# Patient Record
Sex: FEMALE | Race: WHITE | NOT HISPANIC OR LATINO | Employment: STUDENT | URBAN - METROPOLITAN AREA
[De-identification: names, ages, dates, MRNs, and addresses within clinical notes are randomized per-mention and may not be internally consistent; named-entity substitution may affect disease eponyms.]

---

## 2024-04-22 ENCOUNTER — OFFICE VISIT (OUTPATIENT)
Dept: URGENT CARE | Facility: CLINIC | Age: 24
End: 2024-04-22
Payer: COMMERCIAL

## 2024-04-22 VITALS
TEMPERATURE: 99 F | DIASTOLIC BLOOD PRESSURE: 80 MMHG | HEIGHT: 68 IN | WEIGHT: 165 LBS | OXYGEN SATURATION: 98 % | SYSTOLIC BLOOD PRESSURE: 122 MMHG | BODY MASS INDEX: 25.01 KG/M2 | RESPIRATION RATE: 15 BRPM | HEART RATE: 100 BPM

## 2024-04-22 DIAGNOSIS — R09.81 NASAL CONGESTION WITH RHINORRHEA: ICD-10-CM

## 2024-04-22 DIAGNOSIS — B96.89 ACUTE BACTERIAL SINUSITIS: Primary | ICD-10-CM

## 2024-04-22 DIAGNOSIS — J01.90 ACUTE BACTERIAL SINUSITIS: Primary | ICD-10-CM

## 2024-04-22 DIAGNOSIS — J34.89 NASAL CONGESTION WITH RHINORRHEA: ICD-10-CM

## 2024-04-22 DIAGNOSIS — J02.9 SORE THROAT: ICD-10-CM

## 2024-04-22 DIAGNOSIS — R05.8 OTHER COUGH: ICD-10-CM

## 2024-04-22 LAB
CTP QC/QA: YES
CTP QC/QA: YES
HETEROPH AB SER QL: NEGATIVE
MOLECULAR STREP A: NEGATIVE

## 2024-04-22 PROCEDURE — 87651 STREP A DNA AMP PROBE: CPT | Mod: QW,S$GLB,, | Performed by: PHYSICIAN ASSISTANT

## 2024-04-22 PROCEDURE — 86308 HETEROPHILE ANTIBODY SCREEN: CPT | Mod: QW,S$GLB,, | Performed by: PHYSICIAN ASSISTANT

## 2024-04-22 PROCEDURE — 99203 OFFICE O/P NEW LOW 30 MIN: CPT | Mod: S$GLB,,, | Performed by: PHYSICIAN ASSISTANT

## 2024-04-22 RX ORDER — AMOXICILLIN AND CLAVULANATE POTASSIUM 875; 125 MG/1; MG/1
1 TABLET, FILM COATED ORAL 2 TIMES DAILY
Qty: 20 TABLET | Refills: 0 | Status: SHIPPED | OUTPATIENT
Start: 2024-04-22 | End: 2024-05-02

## 2024-04-22 RX ORDER — BROMPHENIRAMINE MALEATE, PSEUDOEPHEDRINE HYDROCHLORIDE, AND DEXTROMETHORPHAN HYDROBROMIDE 2; 30; 10 MG/5ML; MG/5ML; MG/5ML
10 SYRUP ORAL EVERY 4 HOURS PRN
Qty: 118 ML | Refills: 0 | Status: SHIPPED | OUTPATIENT
Start: 2024-04-22 | End: 2024-04-29

## 2024-04-22 RX ORDER — FLUTICASONE PROPIONATE 50 MCG
2 SPRAY, SUSPENSION (ML) NASAL DAILY PRN
Qty: 15.8 ML | Refills: 0 | Status: SHIPPED | OUTPATIENT
Start: 2024-04-22 | End: 2024-05-22

## 2024-04-22 NOTE — PATIENT INSTRUCTIONS
Recommend oral antihistamine (loratadine [Claritin]/cetrizine [Zyrtec]) +/- oral decongestant (pseudoephedrine) for rhinorrhea, steroid nasal spray (flonase), prescription/OTC cough medicine [brompheniramine-pseudoeph-DM (BROMFED DM) ] as needed during day before 8 pm,  Nyquil at night, Tylenol (Acetaminophen) and/or Motrin (Ibuprofen) as directed for control of pain and/or fever.   Bromphed contains pseudoephedrine so please do not take a separate oral decongestant (sudafed/pseudoephedrine) or stimulant (adderall/vyvanse) for ADHD.       If you were prescribed antibiotics, please take them to completion.  Please drink plenty of fluids.  Please get plenty of rest.  Nasal irrigation with a saline spray or Netti Pot like device per their directions is also recommended.  If you  smoke, please stop smoking.    To help ease a sore throat, you can:  Use a sore throat spray.  Suck on hard candy or throat lozenges.  Gargle with warm saltwater a few times each day. Mix of 1/4 teaspoon (1.25 grams) salt in 8 ounces (240 mL) of warm water.  Use a cool mist humidifier to help you breathe easier.    If you negative (-) for a COVID test today and you are continuing to have symptoms, it is recommended to repeat the test in 48 hours x 3. If you continue to be negative, you may return to school/work once you have improved symptoms and no fever for 24 hours without any medications. This applies to all viral illnesses.         Discussed prescriptions and over-the-counter medicines to help with patient's symptoms:  A steroid nose spray (flonase) and antihistamine nasal spray (azelastine) can help with a stuffy nose. It can also help with drainage down the back of your throat.  An antihistamine (loratadine,zyrtec,allegra, xyzal) can help with itching, sneezing, or runny nose.  An antihistamine eye drop can help with itchy eyes.  A decongestant (pseudoephedrine,  Phenylephrine) can help with a stuffy nose. Take <10 days for congestion  and rhinorrhea. Once symptoms improve, proceed with loratadine/zyrtec once a day. These ingredients can keep you up all night, decrease appetite, feel jittery, and raise blood pressure with long term use.  OTC Coricidin can be used for patients with hypertension and palpitations because you cannot use ingredients such as pseudoephedrine and phenylephrine for oral decongestants    Medications that control cough are suppressants and expectorants. Suppressants are tessalon pearls and dextromethorphan. If you have a productive cough with sputum, you need an expectorant called guaifenesin. Dextromethorphan and Guaifenesin are active ingredients in many OTC cough/cold medications such as Dayquil/Nyquil, Mucinex, and Robitussin Mucus+Chest Congestion.            Common Cold Medicine Ingredients Cheat sheet  Acetaminophen (APAP) -pain reliever/fever reducer  Dextromethorphan - cough suppressant  Guaifenesin - expectorant/thins and loosens mucus  Phenylephrine - nasal decongestant  Diphenhydramine or Doxylamine succinate - antihistamine, helps you fall asleep  Promethazine or Brompheniramine - Prescription strength antihistamines    These OTC cold medications are safe to use if you do not have high blood pressure (hypertension) or palpitations.  DayQuil and NyQuil - Cough, Cold & Flu Relief LiquiCaps  DayQuil: Acetaminophen, Dextromethorphan, and Phenylephrine   NyQuil: Acetaminophen, Dextromethorphan, and Doxylamine  DayQuil and NyQuil SEVERE Maximum Strength Cough, Cold & Flu Relief LiquiCaps  DAYQUIL: Acetaminophen, Dextromethorphan, Guaifenesin, and Phenylephrine  NYQUIL: Acetaminophen, Dextromethorphan, Doxylamine, and Phenylephrine   Mucinex DM: Guaifenesin,Dextromethorphan  Mucinex Maximum Strength Sinus-Max® Pressure, Pain & Cough Liquid Gels: Acetaminophen/Dextromethorphan/ Guaifenesin/Phenylephrine     If not allergic, take Tylenol (Acetaminophen) 650 mg to  1 g every 6 hours as needed for fever and/or Motrin  (Ibuprofen) 600 to 800 mg every 6 hours as needed for fever as directed for control of pain and/or fever          Please remember that you have received care at an urgent care today. Urgent cares are not emergency rooms and are not equipped to handle life threatening emergencies and cannot rule in or out certain medical conditions and you may be released before all of your medical problems are known or treated.     Please arrange follow up with your primary care physician or speciality clinic within 2-5 days if your signs and symptoms have not resolved or worsen.     Patient can call our Referral Hotline at [phone] to make an appointment.      Please return here or go to the Emergency Department for any concerns or worsening of condition.  Signs of infection. These include a fever of 100.4°F (38°C) or higher, chills, cough, more sputum or change in color of sputum.  You are having so much trouble breathing that you can only say one or two words at a time.  You need to sit upright at all times to be able to breathe and or cannot lie down.  You have trouble breathing when talking or sitting still.  You have a fever of 100.4°F (38°C) or higher or chills.  You have chest pain when you cough, have trouble breathing but can still talk in full sentences, or cough up blood.

## 2024-04-22 NOTE — PROGRESS NOTES
"Subjective:      Patient ID: Kita Vo is a 23 y.o. female.    Vitals:  height is 5' 8" (1.727 m) and weight is 74.8 kg (165 lb). Her tympanic temperature is 98.6 °F (37 °C). Her blood pressure is 122/80 and her pulse is 100. Her respiration is 15 and oxygen saturation is 98%.     Chief Complaint: Sore Throat (Entered by patient)    Kita Vo is a 23 y.o. female who complains of sore throat for 3 days. Patient notes pain when swallowing that is not worse on one side. Patient with recent exposure to strep. No fever. She reports 2 week history of cough, congestion, and ear fullness. She has tried robitussin-DM with moderate improvement.     Sore Throat   This is a new problem. The current episode started in the past 7 days (3 days). The problem has been unchanged. Neither side of throat is experiencing more pain than the other. There has been no fever. Associated symptoms include congestion, coughing, ear pain and a plugged ear sensation. Pertinent negatives include no abdominal pain, diarrhea, drooling, ear discharge, headaches, hoarse voice, neck pain, shortness of breath, stridor, swollen glands, trouble swallowing or vomiting. She has had exposure to strep. She has had no exposure to mono. She has tried nothing for the symptoms. The treatment provided no relief.       Constitution: Negative for activity change, appetite change, chills, fatigue, fever and generalized weakness.   HENT:  Positive for ear pain, congestion, postnasal drip, sinus pain, sinus pressure and sore throat. Negative for ear discharge, foreign body in ear, tinnitus, drooling, trouble swallowing and voice change.    Neck: Negative for neck pain, neck stiffness and painful lymph nodes.   Cardiovascular:  Negative for chest pain, leg swelling, palpitations and sob on exertion.   Eyes:  Negative for eye itching, eye redness, double vision, blurred vision and eyelid swelling.   Respiratory:  Positive for cough. Negative for sputum " production, shortness of breath, stridor, wheezing and asthma.    Gastrointestinal:  Negative for abdominal pain, nausea, vomiting and diarrhea.   Genitourinary:  Negative for dysuria, frequency and urgency.   Musculoskeletal:  Negative for muscle cramps and muscle ache.   Skin:  Negative for rash.   Allergic/Immunologic: Negative for environmental allergies, seasonal allergies, food allergies and asthma.   Neurological:  Negative for headaches.   Hematologic/Lymphatic: Negative for swollen lymph nodes.   Psychiatric/Behavioral:  Negative for nervous/anxious. The patient is not nervous/anxious.       Objective:     Physical Exam   Constitutional: She is oriented to person, place, and time. No distress.      Comments:Patient is awake and alert, sitting up in exam chair, speaking and answering in complete sentences   normal  HENT:   Head: Normocephalic and atraumatic.      Comments: pain elicited to areas when patient lean forward.  Patient observed sniffling and frequently clearing her throat.    Ears:   Right Ear: External ear and ear canal normal. A middle ear effusion is present.   Left Ear: External ear and ear canal normal. A middle ear effusion is present.   Nose: Rhinorrhea, sinus tenderness and congestion present. Right sinus exhibits maxillary sinus tenderness. Right sinus exhibits no frontal sinus tenderness. Left sinus exhibits maxillary sinus tenderness. Left sinus exhibits no frontal sinus tenderness.   Mouth/Throat: Uvula is midline, oropharynx is clear and moist and mucous membranes are normal. Mucous membranes are moist. No oropharyngeal exudate or posterior oropharyngeal erythema. Tonsils are 0 on the right. Tonsils are 0 on the left. Oropharynx is clear.      Comments:  postnasal discharge noted on the posterior pharyngeal wall    Eyes: Conjunctivae are normal. Pupils are equal, round, and reactive to light. Extraocular movement intact   Neck: Neck supple.   Cardiovascular: Normal rate, regular  rhythm, normal heart sounds and normal pulses.   Pulmonary/Chest: Effort normal and breath sounds normal. No respiratory distress. She has no wheezes. She has no rhonchi. She has no rales.   Abdominal: Normal appearance.   Musculoskeletal: Normal range of motion.         General: Normal range of motion.      Cervical back: She exhibits tenderness.   Lymphadenopathy:     She has cervical adenopathy.   Neurological: She is alert and oriented to person, place, and time.   Skin: Skin is warm. Capillary refill takes less than 2 seconds.   Psychiatric: Her behavior is normal. Mood, judgment and thought content normal.   Nursing note and vitals reviewed.      Assessment:     1. Acute bacterial sinusitis    2. Sore throat    3. Nasal congestion with rhinorrhea    4. Other cough      Patient presents with clinical exam findings and history consistent with above.      On exam, patient is nontoxic appearing and vitals are stable.      Diagnostic testing results were reviewed and discussed with patient/guardian.   Tests ordered in clinic:  Results for orders placed or performed in visit on 04/22/24   POCT Strep A, Molecular   Result Value Ref Range    Molecular Strep A, POC Negative Negative     Acceptable Yes    POCT Infectious mononucleosis antibody   Result Value Ref Range    Monospot Negative Negative     Acceptable Yes        Previous progress notes/admissions/labs and medications were reviewed.      Plan:       Acute bacterial sinusitis  -     amoxicillin-clavulanate 875-125mg (AUGMENTIN) 875-125 mg per tablet; Take 1 tablet by mouth 2 (two) times daily. for 10 days  Dispense: 20 tablet; Refill: 0  -     Ambulatory referral/consult to Internal Medicine    Sore throat  -     POCT Strep A, Molecular  -     POCT Infectious mononucleosis antibody  -     fluticasone propionate (FLONASE) 50 mcg/actuation nasal spray; 2 sprays (100 mcg total) by Each Nostril route daily as needed for Allergies or  "Rhinitis.  Dispense: 15.8 mL; Refill: 0    Nasal congestion with rhinorrhea  -     brompheniramine-pseudoeph-DM (BROMFED DM) 2-30-10 mg/5 mL Syrp; Take 10 mLs by mouth every 4 (four) hours as needed (cough, cold, and congestion).  Dispense: 118 mL; Refill: 0  -     fluticasone propionate (FLONASE) 50 mcg/actuation nasal spray; 2 sprays (100 mcg total) by Each Nostril route daily as needed for Allergies or Rhinitis.  Dispense: 15.8 mL; Refill: 0    Other cough  -     fluticasone propionate (FLONASE) 50 mcg/actuation nasal spray; 2 sprays (100 mcg total) by Each Nostril route daily as needed for Allergies or Rhinitis.  Dispense: 15.8 mL; Refill: 0              1) See orders for this visit as documented in the electronic medical record.  2) Symptomatic therapy suggested: use acetaminophen/ibuprofen every 6-8 hours prn pain or fever, push fluids.   3) Call or return to clinic prn if these symptoms worsen or fail to improve as anticipated.    Discussed results/diagnosis/plan with patient in clinic.  We had shared decision making for patient's treatment. Patient verbalized understanding and in agreement with current treatment plan.     Patient was instructed to return for re-evaluation with urgent care or PCP for continued outpatient workup and management if symptoms do not improve/worsening symptoms. Strict ED versus clinic precautions given in depth.    Discharge and follow-up instructions given verbally/printed with the patient who expressed understanding. [facility] and [facility].              [facility] "Homa Samuel PA-C          Patient Instructions   Recommend oral antihistamine (loratadine [Claritin]/cetrizine [Zyrtec]) +/- oral decongestant (pseudoephedrine) for rhinorrhea, steroid nasal spray (flonase), prescription/OTC cough medicine [brompheniramine-pseudoeph-DM (BROMFED DM) ] as needed during day before 8 pm,  Nyquil at night, Tylenol (Acetaminophen) and/or Motrin (Ibuprofen) " as directed for control of pain and/or fever.   Bromphed contains pseudoephedrine so please do not take a separate oral decongestant (sudafed/pseudoephedrine) or stimulant (adderall/vyvanse) for ADHD.       If you were prescribed antibiotics, please take them to completion.  Please drink plenty of fluids.  Please get plenty of rest.  Nasal irrigation with a saline spray or Netti Pot like device per their directions is also recommended.  If you  smoke, please stop smoking.    To help ease a sore throat, you can:  Use a sore throat spray.  Suck on hard candy or throat lozenges.  Gargle with warm saltwater a few times each day. Mix of 1/4 teaspoon (1.25 grams) salt in 8 ounces (240 mL) of warm water.  Use a cool mist humidifier to help you breathe easier.    If you negative (-) for a COVID test today and you are continuing to have symptoms, it is recommended to repeat the test in 48 hours x 3. If you continue to be negative, you may return to school/work once you have improved symptoms and no fever for 24 hours without any medications. This applies to all viral illnesses.         Discussed prescriptions and over-the-counter medicines to help with patient's symptoms:  A steroid nose spray (flonase) and antihistamine nasal spray (azelastine) can help with a stuffy nose. It can also help with drainage down the back of your throat.  An antihistamine (loratadine,zyrtec,allegra, xyzal) can help with itching, sneezing, or runny nose.  An antihistamine eye drop can help with itchy eyes.  A decongestant (pseudoephedrine,  Phenylephrine) can help with a stuffy nose. Take <10 days for congestion and rhinorrhea. Once symptoms improve, proceed with loratadine/zyrtec once a day. These ingredients can keep you up all night, decrease appetite, feel jittery, and raise blood pressure with long term use.  OTC Coricidin can be used for patients with hypertension and palpitations because you cannot use ingredients such as pseudoephedrine  and phenylephrine for oral decongestants    Medications that control cough are suppressants and expectorants. Suppressants are tessalon pearls and dextromethorphan. If you have a productive cough with sputum, you need an expectorant called guaifenesin. Dextromethorphan and Guaifenesin are active ingredients in many OTC cough/cold medications such as Dayquil/Nyquil, Mucinex, and Robitussin Mucus+Chest Congestion.            Common Cold Medicine Ingredients Cheat sheet  Acetaminophen (APAP) -pain reliever/fever reducer  Dextromethorphan - cough suppressant  Guaifenesin - expectorant/thins and loosens mucus  Phenylephrine - nasal decongestant  Diphenhydramine or Doxylamine succinate - antihistamine, helps you fall asleep  Promethazine or Brompheniramine - Prescription strength antihistamines    These OTC cold medications are safe to use if you do not have high blood pressure (hypertension) or palpitations.  DayQuil and NyQuil - Cough, Cold & Flu Relief LiquiCaps  DayQuil: Acetaminophen, Dextromethorphan, and Phenylephrine   NyQuil: Acetaminophen, Dextromethorphan, and Doxylamine  DayQuil and NyQuil SEVERE Maximum Strength Cough, Cold & Flu Relief LiquiCaps  DAYQUIL: Acetaminophen, Dextromethorphan, Guaifenesin, and Phenylephrine  NYQUIL: Acetaminophen, Dextromethorphan, Doxylamine, and Phenylephrine   Mucinex DM: Guaifenesin,Dextromethorphan  Mucinex Maximum Strength Sinus-Max® Pressure, Pain & Cough Liquid Gels: Acetaminophen/Dextromethorphan/ Guaifenesin/Phenylephrine     If not allergic, take Tylenol (Acetaminophen) 650 mg to  1 g every 6 hours as needed for fever and/or Motrin (Ibuprofen) 600 to 800 mg every 6 hours as needed for fever as directed for control of pain and/or fever          Please remember that you have received care at an urgent care today. Urgent cares are not emergency rooms and are not equipped to handle life threatening emergencies and cannot rule in or out certain medical conditions and you may  be released before all of your medical problems are known or treated.     Please arrange follow up with your primary care physician or speciality clinic within 2-5 days if your signs and symptoms have not resolved or worsen.     Patient can call our Referral Hotline at [phone] to make an appointment.      Please return here or go to the Emergency Department for any concerns or worsening of condition.  Signs of infection. These include a fever of 100.4°F (38°C) or higher, chills, cough, more sputum or change in color of sputum.  You are having so much trouble breathing that you can only say one or two words at a time.  You need to sit upright at all times to be able to breathe and or cannot lie down.  You have trouble breathing when talking or sitting still.  You have a fever of 100.4°F (38°C) or higher or chills.  You have chest pain when you cough, have trouble breathing but can still talk in full sentences, or cough up blood.

## 2024-04-22 NOTE — LETTER
"  April 22, 2024      [facility]  [address] [address]  Phone: [phone]  Fax: [phone]       Patient: Kita Vo   YOB: 2000  Date of Visit: 04/22/2024    To Whom It May Concern:    Jose Vo  was at [facility] on 04/22/2024. The patient may return to work/school on 4/23/24 with no restrictions. If you have any questions or concerns, or if I can be of further assistance, please do not hesitate to contact me.    Sincerely,      Octavialelo Samuel PA-C (Jackie)       "